# Patient Record
Sex: FEMALE | Race: OTHER | HISPANIC OR LATINO | URBAN - METROPOLITAN AREA
[De-identification: names, ages, dates, MRNs, and addresses within clinical notes are randomized per-mention and may not be internally consistent; named-entity substitution may affect disease eponyms.]

---

## 2017-07-19 ENCOUNTER — OUTPATIENT (OUTPATIENT)
Dept: OUTPATIENT SERVICES | Facility: HOSPITAL | Age: 77
LOS: 1 days | End: 2017-07-19
Payer: MEDICAID

## 2017-07-19 ENCOUNTER — TRANSCRIPTION ENCOUNTER (OUTPATIENT)
Age: 77
End: 2017-07-19

## 2017-07-19 VITALS
WEIGHT: 150.36 LBS | HEART RATE: 82 BPM | OXYGEN SATURATION: 95 % | RESPIRATION RATE: 16 BRPM | TEMPERATURE: 99 F | SYSTOLIC BLOOD PRESSURE: 140 MMHG | HEIGHT: 57 IN | DIASTOLIC BLOOD PRESSURE: 79 MMHG

## 2017-07-19 VITALS
SYSTOLIC BLOOD PRESSURE: 121 MMHG | DIASTOLIC BLOOD PRESSURE: 66 MMHG | OXYGEN SATURATION: 98 % | HEART RATE: 71 BPM | RESPIRATION RATE: 18 BRPM

## 2017-07-19 DIAGNOSIS — Z90.49 ACQUIRED ABSENCE OF OTHER SPECIFIED PARTS OF DIGESTIVE TRACT: Chronic | ICD-10-CM

## 2017-07-19 DIAGNOSIS — Z98.890 OTHER SPECIFIED POSTPROCEDURAL STATES: Chronic | ICD-10-CM

## 2017-07-19 DIAGNOSIS — Z90.710 ACQUIRED ABSENCE OF BOTH CERVIX AND UTERUS: Chronic | ICD-10-CM

## 2017-07-19 DIAGNOSIS — H25.11 AGE-RELATED NUCLEAR CATARACT, RIGHT EYE: ICD-10-CM

## 2017-07-19 NOTE — ASU DISCHARGE PLAN (ADULT/PEDIATRIC). - NOTIFY
Bleeding that does not stop/Fever greater than 101/Persistent Nausea and Vomiting/Pain not relieved by Medications/Swelling that continues

## 2017-10-03 NOTE — ASU PATIENT PROFILE, ADULT - PMH
Anemia    [place] (hard of hearing)    OA (osteoarthritis) Anemia    Depression    [place] (hard of hearing)    OA (osteoarthritis)

## 2017-10-03 NOTE — ASU PATIENT PROFILE, ADULT - PSH
Cataract extraction status  right  H/O varicose vein stripping    S/P cholecystectomy    S/P hysterectomy

## 2017-10-04 ENCOUNTER — TRANSCRIPTION ENCOUNTER (OUTPATIENT)
Age: 77
End: 2017-10-04

## 2017-10-04 ENCOUNTER — OUTPATIENT (OUTPATIENT)
Dept: OUTPATIENT SERVICES | Facility: HOSPITAL | Age: 77
LOS: 1 days | End: 2017-10-04
Payer: MEDICAID

## 2017-10-04 VITALS
RESPIRATION RATE: 15 BRPM | SYSTOLIC BLOOD PRESSURE: 137 MMHG | OXYGEN SATURATION: 98 % | HEART RATE: 75 BPM | DIASTOLIC BLOOD PRESSURE: 71 MMHG | HEIGHT: 58 IN | WEIGHT: 153.44 LBS | TEMPERATURE: 99 F

## 2017-10-04 VITALS
SYSTOLIC BLOOD PRESSURE: 144 MMHG | RESPIRATION RATE: 16 BRPM | HEART RATE: 74 BPM | OXYGEN SATURATION: 100 % | DIASTOLIC BLOOD PRESSURE: 76 MMHG

## 2017-10-04 DIAGNOSIS — Z90.49 ACQUIRED ABSENCE OF OTHER SPECIFIED PARTS OF DIGESTIVE TRACT: Chronic | ICD-10-CM

## 2017-10-04 DIAGNOSIS — Z98.890 OTHER SPECIFIED POSTPROCEDURAL STATES: Chronic | ICD-10-CM

## 2017-10-04 DIAGNOSIS — Z90.710 ACQUIRED ABSENCE OF BOTH CERVIX AND UTERUS: Chronic | ICD-10-CM

## 2017-10-04 DIAGNOSIS — Z98.49 CATARACT EXTRACTION STATUS, UNSPECIFIED EYE: Chronic | ICD-10-CM

## 2017-10-04 DIAGNOSIS — H25.12 AGE-RELATED NUCLEAR CATARACT, LEFT EYE: ICD-10-CM

## 2017-10-04 NOTE — ASU DISCHARGE PLAN (ADULT/PEDIATRIC). - NOTIFY
Inability to Tolerate Liquids or Foods/Bleeding that does not stop/Fever greater than 101/Persistent Nausea and Vomiting/Pain not relieved by Medications

## 2019-11-20 NOTE — ASU PREOP CHECKLIST - SURGICAL CONSENT
done Modified Advancement Flap Text: The defect edges were debeveled with a #15 scalpel blade.  Given the location of the defect, shape of the defect and the proximity to free margins a modified advancement flap was deemed most appropriate.  Using a sterile surgical marker, an appropriate advancement flap was drawn incorporating the defect and placing the expected incisions within the relaxed skin tension lines where possible.    The area thus outlined was incised deep to adipose tissue with a #15 scalpel blade.  The skin margins were undermined to an appropriate distance in all directions utilizing iris scissors.

## 2020-11-17 PROBLEM — M19.90 UNSPECIFIED OSTEOARTHRITIS, UNSPECIFIED SITE: Chronic | Status: ACTIVE | Noted: 2017-07-19

## 2020-11-17 PROBLEM — F32.9 MAJOR DEPRESSIVE DISORDER, SINGLE EPISODE, UNSPECIFIED: Chronic | Status: ACTIVE | Noted: 2017-10-04

## 2020-11-17 PROBLEM — D64.9 ANEMIA, UNSPECIFIED: Chronic | Status: ACTIVE | Noted: 2017-07-19

## 2020-11-17 PROBLEM — H91.90 UNSPECIFIED HEARING LOSS, UNSPECIFIED EAR: Chronic | Status: ACTIVE | Noted: 2017-07-19

## 2020-11-18 ENCOUNTER — APPOINTMENT (OUTPATIENT)
Dept: OTOLARYNGOLOGY | Facility: CLINIC | Age: 80
End: 2020-11-18

## 2020-11-23 ENCOUNTER — APPOINTMENT (OUTPATIENT)
Dept: OTOLARYNGOLOGY | Facility: CLINIC | Age: 80
End: 2020-11-23
Payer: MEDICAID

## 2020-11-23 VITALS
BODY MASS INDEX: 29.45 KG/M2 | HEART RATE: 70 BPM | DIASTOLIC BLOOD PRESSURE: 80 MMHG | HEIGHT: 60 IN | WEIGHT: 150 LBS | TEMPERATURE: 97.5 F | SYSTOLIC BLOOD PRESSURE: 142 MMHG

## 2020-11-23 DIAGNOSIS — Z80.0 FAMILY HISTORY OF MALIGNANT NEOPLASM OF DIGESTIVE ORGANS: ICD-10-CM

## 2020-11-23 DIAGNOSIS — Z87.09 PERSONAL HISTORY OF OTHER DISEASES OF THE RESPIRATORY SYSTEM: ICD-10-CM

## 2020-11-23 DIAGNOSIS — H93.13 TINNITUS, BILATERAL: ICD-10-CM

## 2020-11-23 DIAGNOSIS — Z78.9 OTHER SPECIFIED HEALTH STATUS: ICD-10-CM

## 2020-11-23 DIAGNOSIS — H90.3 SENSORINEURAL HEARING LOSS, BILATERAL: ICD-10-CM

## 2020-11-23 PROCEDURE — 92567 TYMPANOMETRY: CPT

## 2020-11-23 PROCEDURE — 92553 AUDIOMETRY AIR & BONE: CPT

## 2020-11-23 PROCEDURE — 99203 OFFICE O/P NEW LOW 30 MIN: CPT

## 2020-11-23 NOTE — ASSESSMENT
[FreeTextEntry1] : Patient here for hearing eval. Does wear hearing aides. 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  Advised that tinnitus related to hearing loss.

## 2020-11-23 NOTE — REVIEW OF SYSTEMS
[Ear Pain] : ear pain [Ear Itch] : ear itch [Hearing Loss] : hearing loss [Ear Noises] : ear noises [Shortness Of Breath] : shortness of breath [Wheezing] : wheezing [Cough] : cough [Joint Pain] : joint pain [Negative] : Heme/Lymph

## 2020-11-23 NOTE — REASON FOR VISIT
[Initial Evaluation] : an initial evaluation for [Ear Pain] : ear pain [Hearing Loss] : hearing loss [Spouse] : spouse [Other: _____] : [unfilled] [FreeTextEntry2] : hearing loss

## 2020-11-23 NOTE — HISTORY OF PRESENT ILLNESS
[de-identified] : c/o problems hearing.  Problem over 10 years.  Does wear hearing aides for past 4 years.  Told by hearing aide dealer patient need hearing aide.  Occ hears noise in ears.

## 2021-09-13 ENCOUNTER — EMERGENCY (EMERGENCY)
Facility: HOSPITAL | Age: 81
LOS: 0 days | Discharge: HOME | End: 2021-09-13
Attending: EMERGENCY MEDICINE
Payer: MEDICAID

## 2021-09-13 VITALS
SYSTOLIC BLOOD PRESSURE: 143 MMHG | OXYGEN SATURATION: 98 % | RESPIRATION RATE: 20 BRPM | DIASTOLIC BLOOD PRESSURE: 69 MMHG | HEART RATE: 73 BPM

## 2021-09-13 VITALS — HEIGHT: 58 IN | WEIGHT: 160.06 LBS

## 2021-09-13 DIAGNOSIS — M19.90 UNSPECIFIED OSTEOARTHRITIS, UNSPECIFIED SITE: ICD-10-CM

## 2021-09-13 DIAGNOSIS — Z98.890 OTHER SPECIFIED POSTPROCEDURAL STATES: Chronic | ICD-10-CM

## 2021-09-13 DIAGNOSIS — Z90.710 ACQUIRED ABSENCE OF BOTH CERVIX AND UTERUS: Chronic | ICD-10-CM

## 2021-09-13 DIAGNOSIS — D72.829 ELEVATED WHITE BLOOD CELL COUNT, UNSPECIFIED: ICD-10-CM

## 2021-09-13 DIAGNOSIS — Z98.49 CATARACT EXTRACTION STATUS, UNSPECIFIED EYE: Chronic | ICD-10-CM

## 2021-09-13 DIAGNOSIS — Z90.710 ACQUIRED ABSENCE OF BOTH CERVIX AND UTERUS: ICD-10-CM

## 2021-09-13 DIAGNOSIS — H91.90 UNSPECIFIED HEARING LOSS, UNSPECIFIED EAR: ICD-10-CM

## 2021-09-13 DIAGNOSIS — F32.9 MAJOR DEPRESSIVE DISORDER, SINGLE EPISODE, UNSPECIFIED: ICD-10-CM

## 2021-09-13 DIAGNOSIS — Z86.2 PERSONAL HISTORY OF DISEASES OF THE BLOOD AND BLOOD-FORMING ORGANS AND CERTAIN DISORDERS INVOLVING THE IMMUNE MECHANISM: ICD-10-CM

## 2021-09-13 DIAGNOSIS — N81.4 UTEROVAGINAL PROLAPSE, UNSPECIFIED: ICD-10-CM

## 2021-09-13 DIAGNOSIS — Z90.49 ACQUIRED ABSENCE OF OTHER SPECIFIED PARTS OF DIGESTIVE TRACT: ICD-10-CM

## 2021-09-13 DIAGNOSIS — Z98.890 OTHER SPECIFIED POSTPROCEDURAL STATES: ICD-10-CM

## 2021-09-13 DIAGNOSIS — Z90.49 ACQUIRED ABSENCE OF OTHER SPECIFIED PARTS OF DIGESTIVE TRACT: Chronic | ICD-10-CM

## 2021-09-13 DIAGNOSIS — R39.15 URGENCY OF URINATION: ICD-10-CM

## 2021-09-13 DIAGNOSIS — Z98.49 CATARACT EXTRACTION STATUS, UNSPECIFIED EYE: ICD-10-CM

## 2021-09-13 DIAGNOSIS — R10.30 LOWER ABDOMINAL PAIN, UNSPECIFIED: ICD-10-CM

## 2021-09-13 LAB
APPEARANCE UR: CLEAR — SIGNIFICANT CHANGE UP
BACTERIA # UR AUTO: ABNORMAL
BILIRUB UR-MCNC: NEGATIVE — SIGNIFICANT CHANGE UP
COLOR SPEC: YELLOW — SIGNIFICANT CHANGE UP
COMMENT - URINE: SIGNIFICANT CHANGE UP
DIFF PNL FLD: ABNORMAL
EPI CELLS # UR: SIGNIFICANT CHANGE UP
GLUCOSE UR QL: NEGATIVE MG/DL — SIGNIFICANT CHANGE UP
KETONES UR-MCNC: NEGATIVE — SIGNIFICANT CHANGE UP
LEUKOCYTE ESTERASE UR-ACNC: ABNORMAL
NITRITE UR-MCNC: NEGATIVE — SIGNIFICANT CHANGE UP
PH UR: 6 — SIGNIFICANT CHANGE UP (ref 5–8)
PROT UR-MCNC: 30 MG/DL
RBC CASTS # UR COMP ASSIST: ABNORMAL /HPF (ref 0–4)
SP GR SPEC: 1.02 — SIGNIFICANT CHANGE UP (ref 1.01–1.02)
TRI-PHOS CRY UR QL COMP ASSIST: ABNORMAL
UROBILINOGEN FLD QL: NEGATIVE MG/DL — SIGNIFICANT CHANGE UP
WBC UR QL: SIGNIFICANT CHANGE UP

## 2021-09-13 PROCEDURE — 87086 URINE CULTURE/COLONY COUNT: CPT

## 2021-09-13 PROCEDURE — 87070 CULTURE OTHR SPECIMN AEROBIC: CPT

## 2021-09-13 PROCEDURE — 81001 URINALYSIS AUTO W/SCOPE: CPT

## 2021-09-13 PROCEDURE — 87186 SC STD MICRODIL/AGAR DIL: CPT

## 2021-09-13 PROCEDURE — 87077 CULTURE AEROBIC IDENTIFY: CPT

## 2021-09-13 PROCEDURE — 99283 EMERGENCY DEPT VISIT LOW MDM: CPT

## 2021-09-13 PROCEDURE — 99284 EMERGENCY DEPT VISIT MOD MDM: CPT

## 2021-09-13 NOTE — ED STATDOCS - NSICDXPASTMEDICALHX_GEN_ALL_CORE_FT
PAST MEDICAL HISTORY:  Anemia     Depression     [place] (hard of hearing)     OA (osteoarthritis)

## 2021-09-13 NOTE — ED STATDOCS - PATIENT PORTAL LINK FT
You can access the FollowMyHealth Patient Portal offered by [facility] by registering at the following website: [email or web address]. By joining [employer]’s FollowMyHealth portal, you will also be able to view your health information using other applications (apps) compatible with our system.

## 2021-09-13 NOTE — ED ADULT NURSE NOTE - NSICDXPASTMEDICALHX_GEN_ALL_CORE_FT
PAST MEDICAL HISTORY:  Anemia     Depression     [place] (hard of hearing)     OA (osteoarthritis)

## 2021-09-13 NOTE — ED ADULT NURSE NOTE - CHIEF COMPLAINT QUOTE
c/o infection secondary to prolapse uterus, patient states she has been on antibiotics for past month with no relief, patient is due for surgery for prolapse uterus in october 2021, c/o vaginal odor and urinary frequency    patient's daughter Krystina Cisneros [phone], patient's daughter [phone]

## 2021-09-13 NOTE — ED STATDOCS - NSFOLLOWUPINSTRUCTIONS_ED_ALL_ED_FT
Health  	                       CYST[facility]ELE - AfterCare(R) Instructions(ER/ED)           Cistocele    LO QUE NECESITA SABER:    El cistocele es leilani condición en la cual parte de la vejiga baja o  dentro de la vagina debido a un debilitamiento o estiramiento de los músculos pélvicos. Es posible que la vejiga comience a sobresalir por la abertura de la vagina.             INSTRUCCIONES SOBRE EL SREE HOSPITALARIA:    Regrese a la berenice de emergencias si:  •Tiene sangrado vaginal que no es saavedra periodo menstrual.      •Le sale leilani masa abultada por la vagina que no puede empujar hacia dentro.      •Usted tiene dolor intenso en la parte inferior del abdomen.      •Tiene flujo vaginal con mal olor.      Llame a saavedra médico o ginecólogo si:  •Tiene fiebre.      •Tiene escalofríos o se siente débil y dolorido.      •Le duele la parte inferior del abdomen o de la espalda y el dolor no se le pasa.      •Usted no puede orinar.      •Tiene alguna pregunta acerca de saavedra condición o cuidado.      Maneje o prevenga el cistocele:  •Katey los ejercicios de [place] con frecuencia.Estos ejercicios pueden ayudar a fortalecer los músculos del suelo pélvico. Apriete los músculos de saavedra pelvis (los músculos que usa para dejar de orinar). Manténgalos apretados francia 5 segundos y luego relájelos francia 5 segundos. Poco a poco, trabaje en apretar los músculos por 10 segundos. Katey al menos 3 juegos de 10 repeticiones todos los días.      •Evite los esfuerzos.No levante objetos pesados, permanezca de pie por mucho tiempo o katey esfuerzo al tener leilani evacuación intestinal. Evite el estreñimiento bebiendo más líquidos y comiendo alimentos ricos en fibra. Consulte cuál es la cantidad de líquidos que usted debe clare por día. Entre los alimentos con un alto contenido de fibra se incluyen las frutas y verduras frescas y los granos integrales.      •Mantenga un peso saludable.Consulte con saavedra médico sobre el peso ideal para usted. Pídale que lo ayude a crear un plan para bajar de peso si tiene sobrepeso. También puede ayudarlo a crear un plan de ejercicio. El ejercicio ayuda a que los intestinos funcionen mejor y a que disminuya la presión dentro del colon.  Caminar para ejercitarse           •Mantenga un diario.Anote la cantidad de veces que orina a diario. Describa el color y la cantidad de orina. Traiga el registro con ted a perico citas de seguimiento.      Acuda a perico consultas de control con saavedra médico o ginecólogo según le indicaron:Anote perico preguntas para que se acuerde de hacerlas francia perico visitas.       © Copyright [employer]           back to top                          © Copyright [employer]

## 2021-09-13 NOTE — ED STATDOCS - NSICDXPASTSURGICALHX_GEN_ALL_CORE_FT
PAST SURGICAL HISTORY:  Cataract extraction status right    H/O varicose vein stripping     S/P cholecystectomy     S/P hysterectomy

## 2021-09-13 NOTE — ED ADULT TRIAGE NOTE - CHIEF COMPLAINT QUOTE
c/o infection secondary to prolapse uterus, patient states she has been on antibiotics for past month with no relief, patient is due for surgery for prolapse uterus in october 2021, c/o vaginal odor and urinary frequency    patient's daughter Krystina Cisneros [phone] c/o infection secondary to prolapse uterus, patient states she has been on antibiotics for past month with no relief, patient is due for surgery for prolapse uterus in october 2021, c/o vaginal odor and urinary frequency    patient's daughter Krystina Cisneros [phone], patient's daughter [phone]

## 2021-09-13 NOTE — ED STATDOCS - OBJECTIVE STATEMENT
80 y/o female with a PMHx of bladder prolapse, presents to the ED c/o worsening lower abdominal pain and vaginal pain. Pt has been using pads and thinks its causing irritation to area of prolapse. Pt was placed on bactrim and Macrobid with no relief. Pt has an appointment scheduled in October for repair of prolapse.  [place]  used, ID#: [id number]

## 2021-09-13 NOTE — ED STATDOCS - PROGRESS NOTE DETAILS
Per , 82 y/o Female reports pelvic pressure / vaginal pain with h/o weak bladder that slips down with urinary urgency / incontinence / urine leakage.  Pt denies vaginal dc, vaginal irritation, itching.  Neg fevers, chills, back pains, vomiting.  Pt reports she has been on Bactrim and Macrobid and took as directed without relief.  Pt reports h/o hysterectomy years ago.  Pt reports she is scheduled for surgery in Oct to fix bladder.  On exam, CTA B. RRR.  Obese Female, soft abd, NT/ ND.  Pelvic: Neg external lesions.  Pink tissue / cystocele protruding from introitus.  Neg lesions / dc to vaginal walls.  Neg friable tissue / bleeding.  Discussed case with pt 's daughter.  She confirms pt Had hysterectomy in the past.  Was owndering if any surgery could be done today, because pt is so uncomfortable at home.  Told her she would have to reach out to Surgeon about moving up surgery date.  Will check U/A today.  Marguerite Kumar PA-C U/A with small leuk esterase and blood.  3-5 WBC. Neg nitrite.   Occasional bacteria and epithelial cells.  Will await Urine Cx results before treating.  Pt has been on 2 Abx (Macrobid and Bactrim since 9/1/2021.). Pt with symptoms are consistent with Cystocele, pelvic pressure, urinary urgency and leakage.  Should f/u with her PMD who is treating Cystocele / performing surgery.  Marguerite Kumar PA-C

## 2021-09-15 LAB
-  AMIKACIN: SIGNIFICANT CHANGE UP
-  AMOXICILLIN/CLAVULANIC ACID: SIGNIFICANT CHANGE UP
-  AMPICILLIN/SULBACTAM: SIGNIFICANT CHANGE UP
-  AMPICILLIN: SIGNIFICANT CHANGE UP
-  AZTREONAM: SIGNIFICANT CHANGE UP
-  CEFAZOLIN: SIGNIFICANT CHANGE UP
-  CEFEPIME: SIGNIFICANT CHANGE UP
-  CEFOXITIN: SIGNIFICANT CHANGE UP
-  CEFTRIAXONE: SIGNIFICANT CHANGE UP
-  CIPROFLOXACIN: SIGNIFICANT CHANGE UP
-  ERTAPENEM: SIGNIFICANT CHANGE UP
-  GENTAMICIN: SIGNIFICANT CHANGE UP
-  LEVOFLOXACIN: SIGNIFICANT CHANGE UP
-  MEROPENEM: SIGNIFICANT CHANGE UP
-  NITROFURANTOIN: SIGNIFICANT CHANGE UP
-  PIPERACILLIN/TAZOBACTAM: SIGNIFICANT CHANGE UP
-  TOBRAMYCIN: SIGNIFICANT CHANGE UP
-  TRIMETHOPRIM/SULFAMETHOXAZOLE: SIGNIFICANT CHANGE UP
CULTURE RESULTS: SIGNIFICANT CHANGE UP
METHOD TYPE: SIGNIFICANT CHANGE UP
ORGANISM # SPEC MICROSCOPIC CNT: SIGNIFICANT CHANGE UP
ORGANISM # SPEC MICROSCOPIC CNT: SIGNIFICANT CHANGE UP
SPECIMEN SOURCE: SIGNIFICANT CHANGE UP

## 2021-09-16 LAB
CULTURE RESULTS: SIGNIFICANT CHANGE UP
SPECIMEN SOURCE: SIGNIFICANT CHANGE UP

## 2021-09-16 NOTE — ED POST DISCHARGE NOTE - RESULT SUMMARY
ID [id number].  spoke with nicholas's grand daughter regarding urine culture, as per note she is on bactrim/macrobid.  urine resistant, will switch to augmentin.  pmd f/u advised -Case Tillman PA-C

## 2024-07-06 ENCOUNTER — NON-APPOINTMENT (OUTPATIENT)
Age: 84
End: 2024-07-06

## 2024-07-10 ENCOUNTER — NON-APPOINTMENT (OUTPATIENT)
Age: 84
End: 2024-07-10